# Patient Record
Sex: MALE | Race: WHITE | NOT HISPANIC OR LATINO
[De-identification: names, ages, dates, MRNs, and addresses within clinical notes are randomized per-mention and may not be internally consistent; named-entity substitution may affect disease eponyms.]

---

## 2022-09-10 ENCOUNTER — NON-APPOINTMENT (OUTPATIENT)
Age: 42
End: 2022-09-10

## 2022-09-15 ENCOUNTER — APPOINTMENT (OUTPATIENT)
Dept: FAMILY MEDICINE | Facility: CLINIC | Age: 42
End: 2022-09-15

## 2022-09-15 VITALS
SYSTOLIC BLOOD PRESSURE: 120 MMHG | HEART RATE: 94 BPM | OXYGEN SATURATION: 97 % | DIASTOLIC BLOOD PRESSURE: 74 MMHG | TEMPERATURE: 99.5 F | RESPIRATION RATE: 16 BRPM

## 2022-09-15 VITALS
DIASTOLIC BLOOD PRESSURE: 78 MMHG | SYSTOLIC BLOOD PRESSURE: 120 MMHG | HEART RATE: 93 BPM | OXYGEN SATURATION: 96 % | RESPIRATION RATE: 16 BRPM | TEMPERATURE: 97.9 F

## 2022-09-15 PROCEDURE — 99204 OFFICE O/P NEW MOD 45 MIN: CPT

## 2022-09-15 RX ORDER — ALBUTEROL SULFATE 90 UG/1
108 (90 BASE) INHALANT RESPIRATORY (INHALATION)
Qty: 1 | Refills: 1 | Status: ACTIVE | COMMUNITY
Start: 2022-09-15 | End: 1900-01-01

## 2022-09-16 ENCOUNTER — NON-APPOINTMENT (OUTPATIENT)
Age: 42
End: 2022-09-16

## 2022-09-16 LAB — SARS-COV-2 N GENE NPH QL NAA+PROBE: NOT DETECTED

## 2022-09-21 NOTE — ASSESSMENT
[FreeTextEntry1] : VSS \par rapid flu test negative\par covid pcr pending\par medication as prescribed, medication education done \par albuterol q 4-6 h prn \par c/w doxycycline as symptoms may be due to acute bronchitis \par advised to increase fluid intake, rest, and acetaminophen/ibuprofen prn pain/fever\par consider cxr/pulm eval if symptoms are persistent \par follow up in office if sx persists or worsens\par patient verbalizes understanding and is stable upon d/c\par

## 2022-09-21 NOTE — HISTORY OF PRESENT ILLNESS
[FreeTextEntry8] : c/o cough \par cough was present for about 2 weeks \par was in [place] at the time \par noted a tick on his body \par pulled tick off after it had been on him for 2-3 days \par went to urgent care on 9/11 - was given doxy 100 mg BID x 10 days \par denies any rash - pulled off on groin \par taking doxy since - has since tested the tick and notes it was not a deer tick\par on 9/13- low grade fever, sore throat, persistent sore throat \par headaches, chills, night sweats \par taking dayquil \par has not been able to sleep, difficulty swallowing \par 2 months ago, got sick from kids\par cold symptoms at the time \par recurrent cough x 2-3 weeks\par lasted about 6 weeks \par was going away \par triggering every time he is talking- a cough \par denies any history of asthma\par lasting 2-3 weeks \par denies any other associated symptoms\par denies any other complaints or concerns at this time

## 2022-09-21 NOTE — PHYSICAL EXAM
[Normal] : no posterior cervical lymphadenopathy and no anterior cervical lymphadenopathy [de-identified] : recurrent coughing fits [de-identified] : + transmitted upper airway sounds

## 2022-09-21 NOTE — REVIEW OF SYSTEMS
[Fatigue] : fatigue [Postnasal Drip] : postnasal drip [Nasal Discharge] : nasal discharge [Sore Throat] : sore throat [Cough] : cough [Negative] : Heme/Lymph

## 2022-09-23 ENCOUNTER — OUTPATIENT (OUTPATIENT)
Dept: OUTPATIENT SERVICES | Facility: HOSPITAL | Age: 42
LOS: 1 days | End: 2022-09-23
Payer: COMMERCIAL

## 2022-09-23 ENCOUNTER — APPOINTMENT (OUTPATIENT)
Dept: RADIOLOGY | Facility: CLINIC | Age: 42
End: 2022-09-23

## 2022-09-23 DIAGNOSIS — R06.02 SHORTNESS OF BREATH: ICD-10-CM

## 2022-09-23 DIAGNOSIS — R05.9 COUGH, UNSPECIFIED: ICD-10-CM

## 2022-09-23 PROCEDURE — 71046 X-RAY EXAM CHEST 2 VIEWS: CPT | Mod: 26

## 2022-09-23 PROCEDURE — 71046 X-RAY EXAM CHEST 2 VIEWS: CPT

## 2022-09-26 ENCOUNTER — APPOINTMENT (OUTPATIENT)
Dept: FAMILY MEDICINE | Facility: CLINIC | Age: 42
End: 2022-09-26

## 2022-09-26 ENCOUNTER — MED ADMIN CHARGE (OUTPATIENT)
Age: 42
End: 2022-09-26

## 2022-09-26 VITALS
TEMPERATURE: 96.4 F | HEART RATE: 75 BPM | HEIGHT: 70 IN | OXYGEN SATURATION: 98 % | RESPIRATION RATE: 16 BRPM | SYSTOLIC BLOOD PRESSURE: 116 MMHG | BODY MASS INDEX: 18.47 KG/M2 | WEIGHT: 129 LBS | DIASTOLIC BLOOD PRESSURE: 68 MMHG

## 2022-09-26 DIAGNOSIS — S30.861A INSECT BITE (NONVENOMOUS) OF ABDOMINAL WALL, INITIAL ENCOUNTER: ICD-10-CM

## 2022-09-26 DIAGNOSIS — Z80.42 FAMILY HISTORY OF MALIGNANT NEOPLASM OF PROSTATE: ICD-10-CM

## 2022-09-26 DIAGNOSIS — Z23 ENCOUNTER FOR IMMUNIZATION: ICD-10-CM
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
## 2022-09-26 DIAGNOSIS — Z82.49 FAMILY HISTORY OF ISCHEMIC HEART DISEASE AND OTHER DISEASES OF THE CIRCULATORY SYSTEM: ICD-10-CM

## 2022-09-26 DIAGNOSIS — Z87.09 PERSONAL HISTORY OF OTHER DISEASES OF THE RESPIRATORY SYSTEM: ICD-10-CM

## 2022-09-26 PROCEDURE — 99396 PREV VISIT EST AGE 40-64: CPT

## 2022-09-26 RX ORDER — COVID-19 TEST SPECIMEN COLLECT
MISCELLANEOUS MISCELLANEOUS
Refills: 0 | Status: COMPLETED | COMMUNITY
End: 2022-09-26

## 2022-09-26 RX ORDER — PREDNISONE 20 MG/1
20 TABLET ORAL TWICE DAILY
Qty: 10 | Refills: 0 | Status: COMPLETED | COMMUNITY
Start: 2022-09-15 | End: 2022-09-26

## 2022-09-26 RX ORDER — GUAIFENESIN AND CODEINE PHOSPHATE 10; 100 MG/5ML; MG/5ML
100-10 SOLUTION ORAL EVERY 6 HOURS
Qty: 1 | Refills: 0 | Status: COMPLETED | COMMUNITY
Start: 2022-09-15 | End: 2022-09-26

## 2022-09-28 PROBLEM — S30.861A TICK BITE OF GROIN: Status: RESOLVED | Noted: 2022-09-21 | Resolved: 2022-09-28

## 2022-09-28 PROBLEM — Z87.09 HISTORY OF SORE THROAT: Status: RESOLVED | Noted: 2022-09-21 | Resolved: 2022-09-28

## 2022-09-28 RX ORDER — DOXYCYCLINE 100 MG/1
100 CAPSULE ORAL
Qty: 20 | Refills: 0 | Status: COMPLETED | COMMUNITY
Start: 2022-09-11

## 2022-09-28 RX ORDER — NIRMATRELVIR AND RITONAVIR 300-100 MG
20 X 150 MG & KIT ORAL
Qty: 30 | Refills: 0 | Status: COMPLETED | COMMUNITY
Start: 2022-09-26 | End: 2022-09-28

## 2022-09-28 NOTE — HISTORY OF PRESENT ILLNESS
[FreeTextEntry1] : CPE [de-identified] : overall is feeling well\par - feeling back to baseline in terms of cough \par energy has been good\par denies any chest tightness or shortness of breath \par denies any other associated symptoms \par denies any other complaints or concerns at this time

## 2022-09-28 NOTE — HEALTH RISK ASSESSMENT
[Very Good] : ~his/her~  mood as very good [Never] : Never [Yes] : Yes [Monthly or less (1 pt)] : Monthly or less (1 point) [1 or 2 (0 pts)] : 1 or 2 (0 points) [Never (0 pts)] : Never (0 points) [No] : In the past 12 months have you used drugs other than those required for medical reasons? No [0] : 2) Feeling down, depressed, or hopeless: Not at all (0) [HIV test declined] : HIV test declined [Hepatitis C test declined] : Hepatitis C test declined [With Family] : lives with family [Employed] : employed [Fully functional (bathing, dressing, toileting, transferring, walking, feeding)] : Fully functional (bathing, dressing, toileting, transferring, walking, feeding) [Fully functional (using the telephone, shopping, preparing meals, housekeeping, doing laundry, using] : Fully functional and needs no help or supervision to perform IADLs (using the telephone, shopping, preparing meals, housekeeping, doing laundry, using transportation, managing medications and managing finances) [None] : None [] :  [# Of Children ___] : has [unfilled] children [FreeTextEntry1] : none  [de-identified] : none  [de-identified] : will be seeing pulm this week [Audit-CScore] : 1 [de-identified] : "not much currently" [de-identified] : balanced; supplements- mvn  [Change in mental status noted] : No change in mental status noted [Language] : denies difficulty with language [Sexually Active] : not sexually active [High Risk Behavior] : no high risk behavior [Reports changes in hearing] : Reports no changes in hearing [Reports changes in vision] : Reports no changes in vision [Reports changes in dental health] : Reports no changes in dental health [de-identified] : wife and 2 kids  [FreeTextEntry2] :

## 2022-09-30 ENCOUNTER — NON-APPOINTMENT (OUTPATIENT)
Age: 42
End: 2022-09-30

## 2022-09-30 ENCOUNTER — APPOINTMENT (OUTPATIENT)
Dept: PULMONOLOGY | Facility: CLINIC | Age: 42
End: 2022-09-30

## 2022-09-30 VITALS
TEMPERATURE: 97.3 F | WEIGHT: 135 LBS | RESPIRATION RATE: 16 BRPM | BODY MASS INDEX: 19.33 KG/M2 | SYSTOLIC BLOOD PRESSURE: 124 MMHG | OXYGEN SATURATION: 98 % | HEIGHT: 70 IN | DIASTOLIC BLOOD PRESSURE: 66 MMHG | HEART RATE: 66 BPM

## 2022-09-30 DIAGNOSIS — Z78.9 OTHER SPECIFIED HEALTH STATUS: ICD-10-CM

## 2022-09-30 DIAGNOSIS — R06.02 SHORTNESS OF BREATH: ICD-10-CM

## 2022-09-30 DIAGNOSIS — Z82.49 FAMILY HISTORY OF ISCHEMIC HEART DISEASE AND OTHER DISEASES OF THE CIRCULATORY SYSTEM: ICD-10-CM

## 2022-09-30 DIAGNOSIS — Z80.8 FAMILY HISTORY OF MALIGNANT NEOPLASM OF OTHER ORGANS OR SYSTEMS: ICD-10-CM

## 2022-09-30 DIAGNOSIS — J34.2 DEVIATED NASAL SEPTUM: ICD-10-CM

## 2022-09-30 DIAGNOSIS — Z87.09 PERSONAL HISTORY OF OTHER DISEASES OF THE RESPIRATORY SYSTEM: ICD-10-CM

## 2022-09-30 DIAGNOSIS — Z80.42 FAMILY HISTORY OF MALIGNANT NEOPLASM OF PROSTATE: ICD-10-CM

## 2022-09-30 DIAGNOSIS — R09.82 POSTNASAL DRIP: ICD-10-CM

## 2022-09-30 DIAGNOSIS — Z86.19 PERSONAL HISTORY OF OTHER INFECTIOUS AND PARASITIC DISEASES: ICD-10-CM

## 2022-09-30 PROCEDURE — 94010 BREATHING CAPACITY TEST: CPT

## 2022-09-30 PROCEDURE — 95012 NITRIC OXIDE EXP GAS DETER: CPT

## 2022-09-30 PROCEDURE — 99204 OFFICE O/P NEW MOD 45 MIN: CPT | Mod: 25

## 2022-09-30 RX ORDER — ALBUTEROL SULFATE 2.5 MG/3ML
(2.5 MG/3ML) SOLUTION RESPIRATORY (INHALATION)
Qty: 120 | Refills: 3 | Status: ACTIVE | COMMUNITY
Start: 2022-09-30 | End: 1900-01-01

## 2022-09-30 RX ORDER — COVID-19 MOLECULAR TEST ASSAY
KIT MISCELLANEOUS
Qty: 8 | Refills: 0 | Status: ACTIVE | COMMUNITY
Start: 2022-07-18

## 2022-09-30 RX ORDER — BUDESONIDE AND FORMOTEROL FUMARATE DIHYDRATE 160; 4.5 UG/1; UG/1
160-4.5 AEROSOL RESPIRATORY (INHALATION) TWICE DAILY
Qty: 1 | Refills: 3 | Status: ACTIVE | COMMUNITY
Start: 2022-09-30 | End: 1900-01-01

## 2022-09-30 NOTE — HISTORY OF PRESENT ILLNESS
[TextBox_4] : Mr. FREEMAN is a 42 year old male presenting to the office today for initial pulmonary evaluation. His chief complaint is-\par - he notes when he lived in his parents basement, there might be mold \par - he notes ever since then whenever he gets sick he gets lasting cough even after other symptoms have been cleared \par - he notes about four years ago he went to urgent care he was told reactive airways an steroids were given\par - he notes last summer he got another virus from his kids\par - he notes having cough for 5-6 weeks\par - he notes then it went away and got sick again \par - he notes now coughing for about 2 weeks \par - he notes coughing fits \par - he notes he was given steroid and inhaler, cough syrup \par - he notes when he started steroid his symptoms got better \par - he notes inhaler is not helping him \par - he notes any time he gets sick, he continues coughing for weeks\par - he notes he will be going to [place] in 2 weeks \par - he notes cough is worse when he takes a deep breath \par - he notes having deviated septum \par - he notes PND \par - he denies difficulty swallowing\par - he notes sleeping well \par - he notes waking up rested \par - he notes appetite is good \par - he denies any visual issues\par - he notes sense of smell and taste are okay\par - he notes bowels are regular \par - he denies feeling of lump in throat to clear \par \par \par he denies any headaches, nausea, vomiting, fever, chills, sweats, chest pain, chest pressure, diarrhea, constipation, dysphagia, dizziness, leg swelling, leg pain, itchy eyes, itchy ears, heartburn, reflux, sour taste in the mouth, myalgias or arthralgias.

## 2022-09-30 NOTE — ADDENDUM
[FreeTextEntry1] : Documented by Prabhu Conway acting as a scribe for Dr. Morro Choudhary on (09/30/2022).\par \par All medical record entries made by the Scribe were at my, Dr. Morro Choudhary's, direction and personally dictated by me on (09/30/2022). I have reviewed the chart and agree that the record accurately reflects my personal performance of the history, physical exam, assessment and plan. I have personally directed, reviewed, and agree with the discharge instructions.

## 2022-09-30 NOTE — ASSESSMENT
[FreeTextEntry1] : Mr. FREEMAN is a 42 year year old male coming into the office today for an initial evaluation with a prior history of   chickenpox, strep throat as child, deviated septum, frequent prolonged URI, COVID-19 spring 2021 who now comes in for a pulmonary evaluation for now chronic cough most consistent with mild intermittent asthma (currently aggravated), ?Allergy/ PND, ?impaired immunity, COVID-19 spring 2021\par \par - Pulmonary\par   - Chronic Cough \par     - Mild Intermittent Asthma\par     - Allergy and PND \par - Cardiac \par \par Problem 1: Chronic Cough (aggravated) most consistent with mild intermittent asthma, small proportion due to allergy and PND\par - Any cough greater than three weeks duration-differential diagnosis includes-asthma, upper airway cough syndrome, post nasal drip syndrome, gastroesophageal reflux, laryngopharyngeal reflux, cardiac disease (congestive heart failure, medicines, effects, etc), medication effects (b-blockers, ace inhibitors, ARBs, glaucoma meds, etc.), smoking, infectious, multifactorial, etc. \par \par Problem 2: Mild Intermittent Asthma\par - add Symbicort 160 2 inhalations BID \par - add Albuterol 2 puffs BID via nebulizer (in future if he gets sick) \par - (All the medicine with be used as needed bases in the future) \par - Asthma is believed to be caused by inherited (genetic) and environmental factor, but its exact cause is unknown. Asthma may be triggered by allergens, lung infections, or irritants in the air. Asthma triggers are different for each person \par \par Problem 3: Allergy/PND\par - Recommended Simply Saline \par - Get Sinus CT \par - Script given for blood work: asthma profile, food IgE panel, eosinophil level, IgE level, Vitamin D level \par - Environmental measures for allergies were encouraged including mattress and pillow cover, air purifier, and environmental controls. \par \par Problem 4: Rule out immuno deficiency \par - Recommended SaNotize \par - Complete strep pneumonia titers, quantitative immunoglobulins, IgG subclass. \par - Due to the fact that this pt has had more infections than would be expected and immunological blood work is indicated this would include: IgG subclasses, quantitative immunoglobulins, Strep pneumoniae titers as well as Vitamin D levels. Based on this blood work we will be able to decide where the pt needs additional pneumococcal vaccine either Prevnar 13 or pneumovax. Immunology evaluation will also be potentially indicated.\par \par Problem 5: COVID-19 Education\par - s/p COVID-19 infection spring 2021\par - s/p COVID-19 vax x3 \par - COVID-19 booster discussed at length with patient. No additional booster recommended at the current time \par \par Problem 6: Cardiac\par -Recommended cardiac follow up evaluation with cardiologist if needed \par \par Problem 7: Health maintenance\par -s/p flu shot 2022\par -recommended strep pneumonia vaccines: Prevnar-20 vaccine, followed by Pneumo vaccine 23 one year following\par -recommended early intervention for URIs\par -recommended regular osteoporosis evaluations-recommended early dermatological evaluations\par -recommended after the age of 50 to the age of 70, colonoscopy every 5 years\par \par Follow up in 3-4 months\par pt is encouraged to call or fax the office with any questions or concerns.\par -Education provided to the PT regarding their visit and conditions.

## 2022-09-30 NOTE — REASON FOR VISIT
[Initial] : an initial visit [TextBox_44] : now chronic cough most consistent with mild intermittent asthma (currently aggravated), ?Allergy/ PND, ?impaired immunity, COVID-19 spring 2021

## 2022-09-30 NOTE — PROCEDURE
[FreeTextEntry1] : FENO was 17; a normal value being less than 25\par Fractional exhaled nitric oxide (FENO) is regarded as a simple, noninvasive method for assessing eosinophilic airway inflammation. Produced by a variety of cells within the lung, nitric oxide (NO) concentrations are generally low in healthy individuals. However, high concentrations of NO appear to be involved in nonspecific host defense mechanisms and chronic inflammatory diseases such as asthma. The American Thoracic Society (ATS) therefore has recommended using FENO to aid in the diagnosis and monitoring of eosinophilic airway inflammation and asthma, and for identifying steroid responsive individuals whose chronic respiratory symptoms may be caused by airway inflammation. \par \par CXR revealed a normal sized heart; there was no evidence of infiltrate or effusion-- A normal chest radiography \par \par PFT reveals normal flows, with an FEV1 of  4.11L, which is 98% of predicted, with flattened inspiratory limb

## 2022-10-07 ENCOUNTER — APPOINTMENT (OUTPATIENT)
Dept: DERMATOLOGY | Facility: CLINIC | Age: 42
End: 2022-10-07
Payer: COMMERCIAL

## 2022-10-07 ENCOUNTER — NON-APPOINTMENT (OUTPATIENT)
Age: 42
End: 2022-10-07

## 2022-10-07 ENCOUNTER — LABORATORY RESULT (OUTPATIENT)
Age: 42
End: 2022-10-07

## 2022-10-07 DIAGNOSIS — L30.9 DERMATITIS, UNSPECIFIED: ICD-10-CM

## 2022-10-07 DIAGNOSIS — D18.01 HEMANGIOMA OF SKIN AND SUBCUTANEOUS TISSUE: ICD-10-CM

## 2022-10-07 DIAGNOSIS — D48.5 NEOPLASM OF UNCERTAIN BEHAVIOR OF SKIN: ICD-10-CM

## 2022-10-07 DIAGNOSIS — D22.9 MELANOCYTIC NEVI, UNSPECIFIED: ICD-10-CM

## 2022-10-07 DIAGNOSIS — Z12.83 ENCOUNTER FOR SCREENING FOR MALIGNANT NEOPLASM OF SKIN: ICD-10-CM

## 2022-10-07 PROCEDURE — 99204 OFFICE O/P NEW MOD 45 MIN: CPT | Mod: 25

## 2022-10-07 PROCEDURE — 11102 TANGNTL BX SKIN SINGLE LES: CPT

## 2022-10-14 ENCOUNTER — NON-APPOINTMENT (OUTPATIENT)
Age: 42
End: 2022-10-14

## 2022-11-16 ENCOUNTER — NON-APPOINTMENT (OUTPATIENT)
Age: 42
End: 2022-11-16

## 2023-01-30 ENCOUNTER — APPOINTMENT (OUTPATIENT)
Dept: PULMONOLOGY | Facility: CLINIC | Age: 43
End: 2023-01-30

## 2023-10-24 ENCOUNTER — APPOINTMENT (OUTPATIENT)
Dept: FAMILY MEDICINE | Facility: CLINIC | Age: 43
End: 2023-10-24
Payer: COMMERCIAL

## 2023-10-24 VITALS
RESPIRATION RATE: 16 BRPM | TEMPERATURE: 97.1 F | SYSTOLIC BLOOD PRESSURE: 120 MMHG | DIASTOLIC BLOOD PRESSURE: 80 MMHG | HEIGHT: 70 IN | WEIGHT: 143 LBS | OXYGEN SATURATION: 98 % | BODY MASS INDEX: 20.47 KG/M2 | HEART RATE: 91 BPM

## 2023-10-24 DIAGNOSIS — J45.20 MILD INTERMITTENT ASTHMA, UNCOMPLICATED: ICD-10-CM

## 2023-10-24 DIAGNOSIS — Z82.49 FAMILY HISTORY OF ISCHEMIC HEART DISEASE AND OTHER DISEASES OF THE CIRCULATORY SYSTEM: ICD-10-CM

## 2023-10-24 DIAGNOSIS — J45.21 MILD INTERMITTENT ASTHMA WITH (ACUTE) EXACERBATION: ICD-10-CM

## 2023-10-24 DIAGNOSIS — Z86.19 PERSONAL HISTORY OF OTHER INFECTIOUS AND PARASITIC DISEASES: ICD-10-CM

## 2023-10-24 DIAGNOSIS — Z86.2 PERSONAL HISTORY OF DISEASES OF THE BLOOD AND BLOOD-FORMING ORGANS AND CERTAIN DISORDERS INVOLVING THE IMMUNE MECHANISM: ICD-10-CM

## 2023-10-24 DIAGNOSIS — Z00.00 ENCOUNTER FOR GENERAL ADULT MEDICAL EXAMINATION W/OUT ABNORMAL FINDINGS: ICD-10-CM

## 2023-10-24 DIAGNOSIS — Z80.8 FAMILY HISTORY OF MALIGNANT NEOPLASM OF OTHER ORGANS OR SYSTEMS: ICD-10-CM

## 2023-10-24 DIAGNOSIS — U07.1 COVID-19: ICD-10-CM

## 2023-10-24 PROCEDURE — 99396 PREV VISIT EST AGE 40-64: CPT

## 2023-10-24 RX ORDER — HYDROCORTISONE 25 MG/G
2.5 OINTMENT TOPICAL
Qty: 1 | Refills: 1 | Status: COMPLETED | COMMUNITY
Start: 2022-10-07 | End: 2023-10-24

## 2023-10-24 RX ORDER — VALACYCLOVIR 1 G/1
1 TABLET, FILM COATED ORAL
Qty: 20 | Refills: 2 | Status: ACTIVE | COMMUNITY
Start: 2022-10-07 | End: 1900-01-01

## 2023-10-24 RX ORDER — BENZONATATE 100 MG/1
100 CAPSULE ORAL
Qty: 21 | Refills: 0 | Status: COMPLETED | COMMUNITY
Start: 2022-09-11 | End: 2023-10-24

## 2023-12-21 ENCOUNTER — NON-APPOINTMENT (OUTPATIENT)
Age: 43
End: 2023-12-21

## 2024-09-23 DIAGNOSIS — Z00.00 ENCOUNTER FOR GENERAL ADULT MEDICAL EXAMINATION W/OUT ABNORMAL FINDINGS: ICD-10-CM

## 2024-10-03 ENCOUNTER — NON-APPOINTMENT (OUTPATIENT)
Age: 44
End: 2024-10-03

## 2024-10-04 ENCOUNTER — APPOINTMENT (OUTPATIENT)
Dept: DERMATOLOGY | Facility: CLINIC | Age: 44
End: 2024-10-04
Payer: COMMERCIAL

## 2024-10-04 DIAGNOSIS — D48.5 NEOPLASM OF UNCERTAIN BEHAVIOR OF SKIN: ICD-10-CM

## 2024-10-04 DIAGNOSIS — D22.9 MELANOCYTIC NEVI, UNSPECIFIED: ICD-10-CM

## 2024-10-04 DIAGNOSIS — L81.4 OTHER MELANIN HYPERPIGMENTATION: ICD-10-CM

## 2024-10-04 DIAGNOSIS — D18.01 HEMANGIOMA OF SKIN AND SUBCUTANEOUS TISSUE: ICD-10-CM

## 2024-10-04 DIAGNOSIS — Z12.83 ENCOUNTER FOR SCREENING FOR MALIGNANT NEOPLASM OF SKIN: ICD-10-CM

## 2024-10-04 PROCEDURE — 99213 OFFICE O/P EST LOW 20 MIN: CPT | Mod: 25

## 2024-10-04 PROCEDURE — 11102 TANGNTL BX SKIN SINGLE LES: CPT

## 2024-10-04 NOTE — ASSESSMENT
[FreeTextEntry1] : # Neoplasm of uncertain significance, left abdomen; 4mm pink papule - education, counseling - ddx includes irritated nevus, rule out atypia  - to better ascertain diagnosis and alleviate patient's symptoms of irritation, shave biopsy was completed as below: Shave Biopsy: Location: left abdomen Indication: r/o irritated IDN vs skin tag vs other  The indication, benefits, alternatives, and all side effects including pain, bleeding, infection, scar, recurrence, nerve damage were discussed. Informed consent was obtained in writing. The lesions on the above locations were prepped with alcohol and locally anesthetized with 0.7mL of 1% lidocaine. The specimen was removed by tangential shave using a Dermablade. Hemostasis was achieved with pressure and aluminum chloride/drysol. A sterile dressing with Petrolatum ointment was applied to the wound. Verbal and written wound care instructions were given. The specimens were labeled and submitted to pathology for histological evaluation. The patient will be notified of their biopsy results within the next two weeks and appropriate treatment and follow-up recommendations will be made at that time. The procedure was well tolerated without complications.   .  #. Lentigines #nevi #Cherry angioma Full body exam today. No concerning lesions for melanoma or NMSC clinically or under dermoscopy on todays exam. Benign findings as above. - Patient educated on ABCDEs of melanoma screening  - Recommend self skin exam monthly, annual exam by MD - Photoprotection reviewed including sun-protective behaviors, protective clothing, and the use of OTC broad-spectrum SPF 30+ sunscreens was advised  - RTC if develops lesions that are new, symptomatic (bleeding, itching), changing in size/color/shape  #Health care maintenance, screening/FBSE -Sun protection was discussed. The proper use of broad-spectrum UVA/UVB sunscreens with SPF 30 or greater was reviewed and the need for re-application after swimming or sweating or 2-3 hours was emphasized. We talked about judicious use of clothing and avoidance of peak periods of sun exposure. I made the patient aware of the need for year-round protection. ABCDEs of melanoma were also reviewed. -Self-skin checks were also reviewed  -Counseled patient to monitor for changes - FBSE completed today, no lesions concerning for malignancy. Next FBSE due 1 year

## 2024-10-04 NOTE — HISTORY OF PRESENT ILLNESS
[FreeTextEntry1] : NPV- spots on skin [de-identified] : 42 year M new patient here for evaluation of spots on skin, some are growing and get irritated. None bleeding. Uses sunscreen, no moisturizers. Also has itchy spot on foot x years. Gets cold sores once a year, no renal issues, used to take valtrex for it.  last seen October 2022 No personal hx of skin cancer, but grandfather and dad had multiple biopsies, but likely never skin cancer PMH: denies Meds:  All: NKDA

## 2024-10-04 NOTE — PHYSICAL EXAM
[FreeTextEntry3] : PE:  General: well-appearing, alert, in no acute distress  Full body skin exam performed examining scalp, head, face, ears, eyes, mouth, neck, chest, back, abdomen, axilla, buttock, b/l arms, b/l forearms, b/l hands, b/l fingernails, b/l thighs, b/l legs, b/l feet, b/l toenails. Groin and genitalia deferred per patient. Pertinent findings include: - left chest with 1cm x 0.5cm pink to hyperpigmented fleshy papule with slight surrounding erythema - b/l upper arms and back with many tan macules with moth eaten border - trunk/extremities with brown macules and papules, and red small papules; dermoscopy with benign pattern - left medial gluteal fold with fleshy skin colored papule - left foot with slightly eczematous patch with underlying xerosis

## 2024-10-08 LAB — DERMATOLOGY BIOPSY: NORMAL

## 2024-10-25 ENCOUNTER — APPOINTMENT (OUTPATIENT)
Dept: FAMILY MEDICINE | Facility: CLINIC | Age: 44
End: 2024-10-25
Payer: COMMERCIAL

## 2024-10-25 ENCOUNTER — NON-APPOINTMENT (OUTPATIENT)
Age: 44
End: 2024-10-25

## 2024-10-25 VITALS
RESPIRATION RATE: 16 BRPM | TEMPERATURE: 95.7 F | WEIGHT: 148 LBS | BODY MASS INDEX: 21.19 KG/M2 | DIASTOLIC BLOOD PRESSURE: 78 MMHG | HEIGHT: 70 IN | SYSTOLIC BLOOD PRESSURE: 122 MMHG | HEART RATE: 80 BPM | OXYGEN SATURATION: 97 %

## 2024-10-25 DIAGNOSIS — J45.20 MILD INTERMITTENT ASTHMA, UNCOMPLICATED: ICD-10-CM

## 2024-10-25 DIAGNOSIS — N48.30 PRIAPISM, UNSPECIFIED: ICD-10-CM

## 2024-10-25 DIAGNOSIS — Z00.00 ENCOUNTER FOR GENERAL ADULT MEDICAL EXAMINATION W/OUT ABNORMAL FINDINGS: ICD-10-CM

## 2024-10-25 DIAGNOSIS — M54.50 LOW BACK PAIN, UNSPECIFIED: ICD-10-CM

## 2024-10-25 DIAGNOSIS — G89.29 PAIN IN RIGHT SHOULDER: ICD-10-CM

## 2024-10-25 DIAGNOSIS — M25.511 PAIN IN RIGHT SHOULDER: ICD-10-CM

## 2024-10-25 DIAGNOSIS — G89.29 LOW BACK PAIN, UNSPECIFIED: ICD-10-CM

## 2024-10-25 DIAGNOSIS — S76.311A STRAIN OF MUSCLE, FASCIA AND TENDON OF THE POSTERIOR MUSCLE GROUP AT THIGH LEVEL, RIGHT THIGH, INITIAL ENCOUNTER: ICD-10-CM

## 2024-10-25 DIAGNOSIS — Z13.6 ENCOUNTER FOR SCREENING FOR CARDIOVASCULAR DISORDERS: ICD-10-CM

## 2024-10-25 DIAGNOSIS — M79.18 MYALGIA, OTHER SITE: ICD-10-CM

## 2024-10-25 DIAGNOSIS — M53.3 SACROCOCCYGEAL DISORDERS, NOT ELSEWHERE CLASSIFIED: ICD-10-CM

## 2024-10-25 PROCEDURE — 93000 ELECTROCARDIOGRAM COMPLETE: CPT

## 2024-10-25 PROCEDURE — 99396 PREV VISIT EST AGE 40-64: CPT
